# Patient Record
Sex: MALE | URBAN - METROPOLITAN AREA
[De-identification: names, ages, dates, MRNs, and addresses within clinical notes are randomized per-mention and may not be internally consistent; named-entity substitution may affect disease eponyms.]

---

## 2018-03-01 ENCOUNTER — OFFICE VISIT (OUTPATIENT)
Dept: FAMILY MEDICINE CLINIC | Facility: CLINIC | Age: 63
End: 2018-03-01

## 2018-03-01 VITALS
BODY MASS INDEX: 26.04 KG/M2 | HEIGHT: 69 IN | HEART RATE: 72 BPM | OXYGEN SATURATION: 96 % | RESPIRATION RATE: 16 BRPM | WEIGHT: 175.81 LBS | TEMPERATURE: 98 F | DIASTOLIC BLOOD PRESSURE: 84 MMHG | SYSTOLIC BLOOD PRESSURE: 132 MMHG

## 2018-03-01 DIAGNOSIS — J20.9 ACUTE BRONCHITIS, UNSPECIFIED ORGANISM: Primary | ICD-10-CM

## 2018-03-01 PROCEDURE — 99204 OFFICE O/P NEW MOD 45 MIN: CPT | Performed by: FAMILY MEDICINE

## 2018-03-01 RX ORDER — QUETIAPINE 50 MG/1
50 TABLET, FILM COATED ORAL DAILY
COMMUNITY

## 2018-03-01 RX ORDER — ALPRAZOLAM 0.25 MG/1
0.25 TABLET ORAL 2 TIMES DAILY PRN
COMMUNITY

## 2018-03-01 RX ORDER — CITALOPRAM 40 MG/1
20 TABLET ORAL 2 TIMES DAILY
COMMUNITY
End: 2018-03-29

## 2018-03-01 RX ORDER — ECHINACEA 400 MG
1000 CAPSULE ORAL DAILY
COMMUNITY

## 2018-03-01 RX ORDER — DILTIAZEM HYDROCHLORIDE 120 MG/1
120 CAPSULE, COATED, EXTENDED RELEASE ORAL DAILY
COMMUNITY
End: 2018-04-09

## 2018-03-01 RX ORDER — AMITRIPTYLINE HYDROCHLORIDE 100 MG/1
50 TABLET, FILM COATED ORAL NIGHTLY
COMMUNITY
End: 2018-03-29 | Stop reason: DRUGHIGH

## 2018-03-01 RX ORDER — ATORVASTATIN CALCIUM 10 MG/1
10 TABLET, FILM COATED ORAL NIGHTLY
COMMUNITY
End: 2018-04-09

## 2018-03-01 RX ORDER — SULFAMETHOXAZOLE AND TRIMETHOPRIM 800; 160 MG/1; MG/1
1 TABLET ORAL 2 TIMES DAILY
Qty: 20 TABLET | Refills: 0 | Status: SHIPPED | OUTPATIENT
Start: 2018-03-01 | End: 2018-03-11

## 2018-03-01 RX ORDER — NAPROXEN 500 MG/1
500 TABLET ORAL 2 TIMES DAILY PRN
COMMUNITY

## 2018-03-01 RX ORDER — FLUTICASONE PROPIONATE 50 MCG
1 SPRAY, SUSPENSION (ML) NASAL DAILY
COMMUNITY

## 2018-03-01 RX ORDER — BUSPIRONE HYDROCHLORIDE 15 MG/1
15 TABLET ORAL 2 TIMES DAILY
COMMUNITY

## 2018-03-01 RX ORDER — BUTALBITAL/ASPIRIN/CAFFEINE 50-325-40
1 CAPSULE ORAL AS NEEDED
COMMUNITY
End: 2018-03-29

## 2018-03-01 RX ORDER — CETIRIZINE HYDROCHLORIDE 10 MG/1
10 TABLET ORAL DAILY
COMMUNITY
Start: 2016-12-01

## 2018-03-01 NOTE — TELEPHONE ENCOUNTER
Pt seen today,  Pt states he called Olga Faulkner re: referral -contact provided to pt earlier today. Pt has 3 names of providers- for neuropsych  1. Dr. Lizzeth Faulkner  #[phone]-  Pt states she left message for this provider.   2. Dr. Steve Gonzalez  #[phone]

## 2018-03-01 NOTE — PROGRESS NOTES
Chief Complaint:   Patient presents with:  Cough: x1 wk  Chest Congestion      HPI:   This is a 65year old male coming in for acute illness patient with cough that has been going on for 1-2 weeks.     Patient with a history reported by the patient of COPD DilTIAZem HCl ER Coated Beads 120 MG Oral Capsule SR 24 Hr Take 120 mg by mouth daily. Disp:  Rfl:    Flaxseed, Linseed, (FLAXSEED OIL) 1000 MG Oral Cap Take 1,000 mg by mouth daily.  Disp:  Rfl:    Flunisolide HFA 80 MCG/ACT Inhalation Aero Soln Inhale intolerance,   ALLERGIES:  Denies allergic response,    EXAM:   /84 (BP Location: Left arm, Patient Position: Sitting, Cuff Size: large)   Pulse 72   Temp 98 °F (36.7 °C)   Resp 16   Ht 69\"   Wt 175 lb 12.8 oz   SpO2 96%   BMI 25.96 kg/m²  Estimated Plan for appointment with [facility] for counseling     Plan for referral to neurology regarding migraines.   Plan for referral to psychiatry                   Patient/Caregiver Education: Patient/Caregiver Education: There are no barriers to learn

## 2018-03-01 NOTE — PATIENT INSTRUCTIONS
Start antibiotic     Continue with inhalers. Obtain medical record release from last primary care doctor       Plan for appointment with Mook Bedolla for counseling     Plan for referral to neurology regarding migraines.   Plan for referral to psychi

## 2018-03-02 PROBLEM — Z87.891 HISTORY OF TOBACCO USE: Status: ACTIVE | Noted: 2017-11-14

## 2018-03-02 PROBLEM — F41.9 ANXIETY: Status: ACTIVE | Noted: 2017-11-14

## 2018-03-02 PROBLEM — J30.1 CHRONIC SEASONAL ALLERGIC RHINITIS DUE TO POLLEN: Status: ACTIVE | Noted: 2017-11-14

## 2018-03-02 PROBLEM — R91.1 PULMONARY NODULE: Status: ACTIVE | Noted: 2017-11-14

## 2018-03-02 PROBLEM — J41.0 SIMPLE CHRONIC BRONCHITIS (HCC): Status: ACTIVE | Noted: 2017-11-14

## 2018-03-05 PROBLEM — F31.60 BIPOLAR AFFECTIVE DISORDER, CURRENT EPISODE MIXED (HCC): Status: ACTIVE | Noted: 2018-03-05

## 2018-03-05 NOTE — TELEPHONE ENCOUNTER
I am not finding the providers in the system that patient was given. Discussed with Melanie Bess -     Referral for [facility].  Department - ok to direct to provider in [facility]

## 2018-03-05 NOTE — TELEPHONE ENCOUNTER
Pt informed per our central referral department: \"Per Keila galvan [facility]([phone]) referral is not required for Psychiatry as long as patient receives service through [facility]. Referral will be closed.  \"    Informed pt that a referral should not be need

## 2018-03-05 NOTE — TELEPHONE ENCOUNTER
Message sent to central referral pool. The referral is 'open' status right now - will check again tomorrow if it has been authorized and then call patient.

## 2018-03-06 ENCOUNTER — TELEPHONE (OUTPATIENT)
Dept: FAMILY MEDICINE CLINIC | Facility: CLINIC | Age: 63
End: 2018-03-06

## 2018-03-06 DIAGNOSIS — F31.64 BIPOLAR DISORDER, CURRENT EPISODE MIXED, SEVERE, WITH PSYCHOTIC FEATURES (HCC): ICD-10-CM

## 2018-03-06 DIAGNOSIS — G43.919 INTRACTABLE MIGRAINE WITHOUT STATUS MIGRAINOSUS, UNSPECIFIED MIGRAINE TYPE: Primary | ICD-10-CM

## 2018-03-06 NOTE — TELEPHONE ENCOUNTER
Pt states that [facility] does not have a neuropsych option that patient can make appointment with. Spoke with Dr. Singh fruux who states that he can do a neurology referral with Dr. Deonna Jefferson and then also see a psychiatrist at [facility].      Referral pended fo

## 2018-03-07 PROBLEM — G43.919 INTRACTABLE MIGRAINE: Status: ACTIVE | Noted: 2018-03-07

## 2018-03-07 NOTE — TELEPHONE ENCOUNTER
Patient informed that referral was authorized and was given Dr. Emmanuel Bermeo information. He states he has not called [facility] to make appt there yet but will call today and call us back if there are any problems.

## 2018-03-07 NOTE — TELEPHONE ENCOUNTER
Please sign referral so it can be authorized through insurance. I will then give pt a call later today.

## 2018-03-20 PROBLEM — J44.9 COPD (CHRONIC OBSTRUCTIVE PULMONARY DISEASE) (HCC): Status: ACTIVE | Noted: 2018-03-20

## 2018-03-21 ENCOUNTER — TELEPHONE (OUTPATIENT)
Dept: FAMILY MEDICINE CLINIC | Facility: CLINIC | Age: 63
End: 2018-03-21

## 2018-03-21 NOTE — TELEPHONE ENCOUNTER
Spoke w/ patient and he states that he has written orders for lab work that psychiatrist in [address] [place] [address] wants him to have done. He states that he is confused w/ the insurance and wants to have labs drawn here.      Lab orders are already in system, I helped

## 2018-03-21 NOTE — TELEPHONE ENCOUNTER
We received a signed authorization from patient to have us request records from [address] [place] [address] Neurology. The request was mailed to [address] [address], [place], [address].

## 2018-03-21 NOTE — TELEPHONE ENCOUNTER
referrral psych   is ordering a battery of BW tests including VitD;Unine for carcinagens/drug testing etc    can Dr Hernandez Manifold order so his insurance will cover the charges.

## 2018-03-22 PROBLEM — F32.A DEPRESSIVE DISORDER: Status: ACTIVE | Noted: 2018-03-05

## 2018-03-22 PROBLEM — F15.20 CAFFEINE DEPENDENCE (HCC): Status: ACTIVE | Noted: 2018-03-20

## 2018-03-22 PROBLEM — Z72.89 ALCOHOL USE: Status: ACTIVE | Noted: 2018-03-20

## 2018-03-22 PROBLEM — F41.9 ANXIETY DISORDER: Status: ACTIVE | Noted: 2018-03-22

## 2018-03-22 PROBLEM — F84.0 AUTISM SPECTRUM DISORDER: Status: ACTIVE | Noted: 2018-03-22

## 2018-03-22 PROBLEM — F41.9 ANXIETY DISORDER: Status: ACTIVE | Noted: 2018-03-20

## 2018-03-22 PROBLEM — Z86.69 HX OF MIGRAINES: Status: ACTIVE | Noted: 2018-03-07

## 2018-03-22 PROBLEM — G58.9 PINCHED NERVE: Status: ACTIVE | Noted: 2018-03-20

## 2018-03-22 PROBLEM — M19.90 ARTHRITIS: Status: ACTIVE | Noted: 2018-03-20

## 2018-03-22 PROBLEM — H53.50 COLORBLINDNESS: Status: ACTIVE | Noted: 2018-03-22

## 2018-03-24 ENCOUNTER — APPOINTMENT (OUTPATIENT)
Dept: LAB | Age: 63
End: 2018-03-24
Attending: PHYSICIAN ASSISTANT
Payer: COMMERCIAL

## 2018-03-24 DIAGNOSIS — Z13.89 SCREENING FOR SUBSTANCE ABUSE: ICD-10-CM

## 2018-03-24 DIAGNOSIS — F32.A DEPRESSIVE DISORDER: ICD-10-CM

## 2018-03-24 DIAGNOSIS — F41.9 ANXIETY DISORDER, UNSPECIFIED TYPE: ICD-10-CM

## 2018-03-24 LAB
AMPHETAMINE URINE: NEGATIVE
CANNABINOID URINE: NEGATIVE
COCAINE URINE: NEGATIVE
ETHYL ALCOHOL, QUALITATIVE: NEGATIVE
OPIATE URINE: NEGATIVE
PCP URINE: NEGATIVE

## 2018-03-24 PROCEDURE — 84439 ASSAY OF FREE THYROXINE: CPT | Performed by: PHYSICIAN ASSISTANT

## 2018-03-24 PROCEDURE — 80053 COMPREHEN METABOLIC PANEL: CPT | Performed by: PHYSICIAN ASSISTANT

## 2018-03-24 PROCEDURE — 36415 COLL VENOUS BLD VENIPUNCTURE: CPT | Performed by: PHYSICIAN ASSISTANT

## 2018-03-24 PROCEDURE — 82306 VITAMIN D 25 HYDROXY: CPT | Performed by: PHYSICIAN ASSISTANT

## 2018-03-24 PROCEDURE — 80050 GENERAL HEALTH PANEL: CPT

## 2018-03-24 PROCEDURE — 84443 ASSAY THYROID STIM HORMONE: CPT | Performed by: PHYSICIAN ASSISTANT

## 2018-03-24 PROCEDURE — 80061 LIPID PANEL: CPT | Performed by: PHYSICIAN ASSISTANT

## 2018-03-24 PROCEDURE — 83036 HEMOGLOBIN GLYCOSYLATED A1C: CPT | Performed by: PHYSICIAN ASSISTANT

## 2018-03-24 PROCEDURE — 80307 DRUG TEST PRSMV CHEM ANLYZR: CPT

## 2018-03-26 PROBLEM — R79.89 LOW VITAMIN D LEVEL: Status: ACTIVE | Noted: 2018-03-26

## 2018-03-26 PROBLEM — R73.03 PREDIABETES: Status: ACTIVE | Noted: 2018-03-26

## 2018-03-26 PROBLEM — R94.6 ABNORMAL THYROID FUNCTION TEST: Status: ACTIVE | Noted: 2018-03-26

## 2018-03-27 ENCOUNTER — TELEPHONE (OUTPATIENT)
Dept: FAMILY MEDICINE CLINIC | Facility: CLINIC | Age: 63
End: 2018-03-27

## 2018-03-27 NOTE — TELEPHONE ENCOUNTER
----- Message from Pooja Dubose sent at 3/26/2018  6:33 PM CDT -----  RN,     Please contact the patient to inform him that we received a copy of his most recent lab results. His vitamin D level was low.   His hemoglobin A1c was elevated and in the

## 2018-03-27 NOTE — TELEPHONE ENCOUNTER
Patient was new the office with last visit. Has some abnormalities on blood work     If patient is see me as PCP , he needs to set up appointment regarding labs.

## 2018-03-27 NOTE — TELEPHONE ENCOUNTER
Patient states he would like his PCP changed to Dr. Jose Alejandro. Messaged Niki Weber about changing the PCP in [place]. Patient states he has a f/u appt w/ Dr. Jose Alejandro and would like to go over labs then.      Future Appointments  Date Time Provider Harsh Robert   3/2

## 2018-03-29 ENCOUNTER — OFFICE VISIT (OUTPATIENT)
Dept: NEUROLOGY | Facility: CLINIC | Age: 63
End: 2018-03-29

## 2018-03-29 VITALS
HEART RATE: 74 BPM | DIASTOLIC BLOOD PRESSURE: 88 MMHG | WEIGHT: 180 LBS | RESPIRATION RATE: 16 BRPM | SYSTOLIC BLOOD PRESSURE: 138 MMHG | BODY MASS INDEX: 27 KG/M2

## 2018-03-29 DIAGNOSIS — R20.2 PARESTHESIAS: ICD-10-CM

## 2018-03-29 DIAGNOSIS — R41.3 MEMORY LOSS: ICD-10-CM

## 2018-03-29 DIAGNOSIS — Z86.69 HX OF MIGRAINES: Primary | ICD-10-CM

## 2018-03-29 DIAGNOSIS — G58.9 PINCHED NERVE: ICD-10-CM

## 2018-03-29 PROCEDURE — 99205 OFFICE O/P NEW HI 60 MIN: CPT | Performed by: OTHER

## 2018-03-29 RX ORDER — SUMATRIPTAN 50 MG/1
50 TABLET, FILM COATED ORAL EVERY 2 HOUR PRN
Qty: 9 TABLET | Refills: 0 | Status: SHIPPED | OUTPATIENT
Start: 2018-03-29 | End: 2018-04-28

## 2018-03-29 RX ORDER — CITALOPRAM 20 MG/1
20 TABLET ORAL 2 TIMES DAILY
COMMUNITY

## 2018-03-29 RX ORDER — AMITRIPTYLINE HYDROCHLORIDE 25 MG/1
50 TABLET, FILM COATED ORAL
COMMUNITY
Start: 2018-02-02 | End: 2018-03-29

## 2018-03-29 RX ORDER — AMITRIPTYLINE HYDROCHLORIDE 25 MG/1
50 TABLET, FILM COATED ORAL NIGHTLY
Qty: 60 TABLET | Refills: 4 | Status: SHIPPED | OUTPATIENT
Start: 2018-03-29

## 2018-03-29 NOTE — PROGRESS NOTES
Neurology H&P    Tayla Toddmp Patient Status:  No patient class for patient encounter    1955 MRN [id number]   Location ED [facility], [address], [address] Attending No att. providers found   [address] [facility] Day # 0 PCP Deborah Burgos MD also tells me that he has a \"pinched nerve in my neck\" He states that he was supposed to have an EMG in the [place]. He tells me that he had an MRI and that he was told \"I have neck and arm problems because things are starting to close off\".       Current M Disp:  Rfl:    IBUPROFEN 200 MG OR TABS 2 TABLET PRN Disp:  Rfl:        Problem List:  Patient Active Problem List:     Simple chronic bronchitis (HCC)     Pulmonary nodule     Periodic limb movement disorder     History of tobacco use     Chronic seasonal urinating   Heme: no new bruising, no unexplained fevers or chills  Endocrine: no unexplained weight loss or gain, no new fatigue  Musculoskeletal: denies back pain, denies joint pain  Psych: denies depression   Skin: denies any new masses, rashes, lumps o  No areas of restricted diffusion are evident to suggest acute cortical infarction.  No abnormal intracranial enhancement is seen following gadolinium administration.  The anterior midline structures and craniovertebral junction are unremarkable.  The basa copy cube  - Referral to neuroppsychology    Tess Covert, DO  Neurology

## 2018-03-29 NOTE — PROGRESS NOTES
Pt here for evaluation of migraines. Pt reports he likely started experiencing migraines in high school. He was officially diagnosed at ED after a 2 week headache last fall. Pt started amitriptyline about 3-4 months ago and has found relief with that.

## 2018-03-29 NOTE — PATIENT INSTRUCTIONS
Refill policies:    • Allow 2-3 business days for refills; controlled substances may take longer.   • Contact your pharmacy at least 5 days prior to running out of medication and have them send an electronic request or submit request through the [facility] for the entire amount billed. Precertification and Prior Authorizations  If your physician has recommended that you have a procedure or additional testing performed.   [employer] ([employer]) will contact your insurance carrier to obtain pr

## 2018-04-06 ENCOUNTER — TELEPHONE (OUTPATIENT)
Dept: FAMILY MEDICINE CLINIC | Facility: CLINIC | Age: 63
End: 2018-04-06

## 2018-04-06 NOTE — TELEPHONE ENCOUNTER
We received a request for records from [address] [place] [address]. They are requesting copies of all records from patient's chart. This request was sent to scan stat.

## 2018-04-09 ENCOUNTER — OFFICE VISIT (OUTPATIENT)
Dept: FAMILY MEDICINE CLINIC | Facility: CLINIC | Age: 63
End: 2018-04-09

## 2018-04-09 VITALS
DIASTOLIC BLOOD PRESSURE: 84 MMHG | BODY MASS INDEX: 25.97 KG/M2 | HEART RATE: 74 BPM | SYSTOLIC BLOOD PRESSURE: 128 MMHG | WEIGHT: 179.38 LBS | HEIGHT: 69.5 IN | RESPIRATION RATE: 20 BRPM | OXYGEN SATURATION: 97 % | TEMPERATURE: 99 F

## 2018-04-09 DIAGNOSIS — R79.89 LOW VITAMIN D LEVEL: ICD-10-CM

## 2018-04-09 DIAGNOSIS — E78.00 HYPERCHOLESTEREMIA: ICD-10-CM

## 2018-04-09 DIAGNOSIS — J44.1 CHRONIC OBSTRUCTIVE PULMONARY DISEASE WITH ACUTE EXACERBATION (HCC): ICD-10-CM

## 2018-04-09 DIAGNOSIS — J20.9 ACUTE BRONCHITIS, UNSPECIFIED ORGANISM: Primary | ICD-10-CM

## 2018-04-09 PROCEDURE — 99215 OFFICE O/P EST HI 40 MIN: CPT | Performed by: FAMILY MEDICINE

## 2018-04-09 RX ORDER — SULFAMETHOXAZOLE AND TRIMETHOPRIM 800; 160 MG/1; MG/1
1 TABLET ORAL 2 TIMES DAILY
Qty: 20 TABLET | Refills: 0 | Status: SHIPPED | OUTPATIENT
Start: 2018-04-09 | End: 2018-04-19

## 2018-04-09 RX ORDER — ATORVASTATIN CALCIUM 10 MG/1
10 TABLET, FILM COATED ORAL NIGHTLY
Qty: 90 TABLET | Refills: 3 | Status: SHIPPED | OUTPATIENT
Start: 2018-04-09

## 2018-04-09 RX ORDER — DILTIAZEM HYDROCHLORIDE 120 MG/1
120 CAPSULE, COATED, EXTENDED RELEASE ORAL DAILY
Qty: 90 CAPSULE | Refills: 3 | Status: SHIPPED | OUTPATIENT
Start: 2018-04-09

## 2018-04-09 NOTE — PATIENT INSTRUCTIONS
Start antibiotic     Start vit D supplement - 1000 IU one tab twice daily     ( recheck Vit D level in 3 -4 months)     Continue with cholesterol medication and blood pressure medication - will check with coverage for these     Recheck with me in 6 months.

## 2018-04-09 NOTE — PROGRESS NOTES
Chief Complaint:   Patient presents with:  Bronchitis: sxs started again last 2 weeks      HPI:   This is a 65year old male coming in for follow-up care regarding his COPD. Patient has been seen in COPD clinic in [place].     I saw him last month he feels the lungs every 4 (four) hours as needed. Disp:  Rfl:    ALPRAZolam 0.25 MG Oral Tab Take 0.25 mg by mouth 2 (two) times daily as needed. Disp:  Rfl:    BusPIRone HCl 15 MG Oral Tab Take 15 mg by mouth 2 (two) times daily.  Disp:  Rfl:    cetirizine 10 MG O 69.5\"   Wt 179 lb 6.4 oz   SpO2 97%   BMI 26.11 kg/m²  Estimated body mass index is 26.11 kg/m² as calculated from the following:    Height as of this encounter: 69.5\". Weight as of this encounter: 179 lb 6.4 oz. Vital signs reviewed. Appears stated 1000 IU one tab twice daily     ( recheck Vit D level in 3 -4 months)     Continue with cholesterol medication and blood pressure medication - will check with coverage for these     Recheck with me in 6 months.                Patient/Caregiver Education: Pa

## 2018-09-06 ENCOUNTER — TELEPHONE (OUTPATIENT)
Dept: FAMILY MEDICINE CLINIC | Facility: CLINIC | Age: 63
End: 2018-09-06

## 2018-09-06 NOTE — TELEPHONE ENCOUNTER
We received a medical records disc from [address] [place] [address]. Due to the fact we are not able to view the records from the disc, it has been mailed to the patient.

## 2018-12-01 ENCOUNTER — PRIOR ORIGINAL RECORDS (OUTPATIENT)
Dept: HEALTH INFORMATION MANAGEMENT | Facility: OTHER | Age: 63
End: 2018-12-01

## 2019-01-01 ENCOUNTER — EXTERNAL RECORD (OUTPATIENT)
Dept: HEALTH INFORMATION MANAGEMENT | Facility: OTHER | Age: 64
End: 2019-01-01

## 2019-02-25 ENCOUNTER — TELEPHONE (OUTPATIENT)
Dept: NEUROLOGY | Facility: CLINIC | Age: 64
End: 2019-02-25

## 2019-05-01 NOTE — TELEPHONE ENCOUNTER
Patient is due for appointment with Dr. Cy Chapman. Left message for patient to call the office back.      Future appt:    Last Appointment: 4/9/18 sick visit  Cholesterol, Total (mg/dL)   Date Value   03/24/2018 161     HDL Cholesterol (mg/dL)   Date Value   03/2

## 2019-05-07 RX ORDER — ATORVASTATIN CALCIUM 10 MG/1
TABLET, FILM COATED ORAL
Qty: 30 TABLET | Refills: 11 | OUTPATIENT
Start: 2019-05-07

## 2019-05-16 ENCOUNTER — HOSPITAL (OUTPATIENT)
Dept: OTHER | Age: 64
End: 2019-05-16
Attending: UROLOGY

## 2019-05-16 ENCOUNTER — OFFICE VISIT (OUTPATIENT)
Dept: UROLOGY | Age: 64
End: 2019-05-16

## 2019-05-16 DIAGNOSIS — R30.0 DYSURIA: Primary | ICD-10-CM

## 2019-05-16 DIAGNOSIS — R39.15 URGENCY OF URINATION: ICD-10-CM

## 2019-05-16 DIAGNOSIS — C61 MALIGNANT NEOPLASM OF PROSTATE (CMD): ICD-10-CM

## 2019-05-16 DIAGNOSIS — N20.0 NEPHROLITHIASIS: ICD-10-CM

## 2019-05-16 LAB
APPEARANCE, POC: NORMAL
BILIRUBIN, POC: NEGATIVE
BLDR SCAN MLS: 1
COLOR, POC: NORMAL
GLUCOSE UR-MCNC: NEGATIVE MG/DL
KETONES, POC: NEGATIVE
NITRITE, POC: NEGATIVE
OCCULT BLOOD, POC: NORMAL
PH UR: 6 [PH] (ref 5–7)
PROT UR-MCNC: NEGATIVE G/DL
SP GR UR: 1.02 (ref 1–1.03)
UROBILINOGEN UR-MCNC: 0.2 MG/DL (ref 0–1)
WBC (LEUKOCYTE) ESTERASE, POC: NEGATIVE

## 2019-05-16 PROCEDURE — 99203 OFFICE O/P NEW LOW 30 MIN: CPT | Performed by: UROLOGY

## 2019-05-16 PROCEDURE — 51798 US URINE CAPACITY MEASURE: CPT | Performed by: UROLOGY

## 2019-05-16 PROCEDURE — 81003 URINALYSIS AUTO W/O SCOPE: CPT | Performed by: UROLOGY

## 2019-05-16 RX ORDER — FLUTICASONE PROPIONATE 50 MCG
SPRAY, SUSPENSION (ML) NASAL DAILY
COMMUNITY

## 2019-05-16 RX ORDER — MAGNESIUM GLUCONATE 30 MG(550)
TABLET ORAL
COMMUNITY

## 2019-05-16 RX ORDER — QUETIAPINE FUMARATE 50 MG/1
50 TABLET, FILM COATED ORAL 2 TIMES DAILY
COMMUNITY

## 2019-05-16 RX ORDER — FOLIC ACID 0.8 MG
TABLET ORAL
COMMUNITY

## 2019-05-16 RX ORDER — GUAIFENESIN 400 MG/1
400 TABLET ORAL
COMMUNITY

## 2019-05-16 RX ORDER — PHENYLEPHRINE HCL 10 MG/1
10 TABLET, FILM COATED ORAL
COMMUNITY

## 2019-05-16 RX ORDER — CITALOPRAM 40 MG/1
40 TABLET ORAL DAILY
COMMUNITY

## 2019-05-16 RX ORDER — MULTIVIT-MIN/IRON/FOLIC ACID/K 18-600-40
CAPSULE ORAL
COMMUNITY

## 2019-05-16 RX ORDER — BUSPIRONE HYDROCHLORIDE 30 MG/1
30 TABLET ORAL 2 TIMES DAILY
COMMUNITY

## 2019-05-16 SDOH — ECONOMIC STABILITY: TRANSPORTATION INSECURITY
IN THE PAST 12 MONTHS, HAS THE LACK OF TRANSPORTATION KEPT YOU FROM MEDICAL APPOINTMENTS OR FROM GETTING MEDICATIONS?: PATIENT DECLINED

## 2019-05-16 SDOH — SOCIAL STABILITY: SOCIAL NETWORK: ARE YOU MARRIED, WIDOWED, DIVORCED, SEPARATED, NEVER MARRIED, OR LIVING WITH A PARTNER?: MARRIED

## 2019-05-16 SDOH — ECONOMIC STABILITY: TRANSPORTATION INSECURITY
IN THE PAST 12 MONTHS, HAS LACK OF TRANSPORTATION KEPT YOU FROM MEETINGS, WORK, OR FROM GETTING THINGS NEEDED FOR DAILY LIVING?: PATIENT DECLINED

## 2019-05-16 SDOH — ECONOMIC STABILITY: FOOD INSECURITY: WITHIN THE PAST 12 MONTHS, THE FOOD YOU BOUGHT JUST DIDN'T LAST AND YOU DIDN'T HAVE MONEY TO GET MORE.: PATIENT DECLINED

## 2019-05-16 SDOH — SOCIAL STABILITY: SOCIAL INSECURITY
WITHIN THE LAST YEAR, HAVE YOU BEEN KICKED, HIT, SLAPPED, OR OTHERWISE PHYSICALLY HURT BY YOUR PARTNER OR EX-PARTNER?: PATIENT DECLINED

## 2019-05-16 SDOH — SOCIAL STABILITY: SOCIAL NETWORK: IN A TYPICAL WEEK, HOW MANY TIMES DO YOU TALK ON THE PHONE WITH FAMILY, FRIENDS, OR NEIGHBORS?: PATIENT DECLINED

## 2019-05-16 SDOH — ECONOMIC STABILITY: FOOD INSECURITY: WITHIN THE PAST 12 MONTHS, YOU WORRIED THAT YOUR FOOD WOULD RUN OUT BEFORE YOU GOT MONEY TO BUY MORE.: PATIENT DECLINED

## 2019-05-16 SDOH — SOCIAL STABILITY: SOCIAL INSECURITY
WITHIN THE LAST YEAR, HAVE TO BEEN RAPED OR FORCED TO HAVE ANY KIND OF SEXUAL ACTIVITY BY YOUR PARTNER OR EX-PARTNER?: PATIENT DECLINED

## 2019-05-16 SDOH — ECONOMIC STABILITY: INCOME INSECURITY: HOW HARD IS IT FOR YOU TO PAY FOR THE VERY BASICS LIKE FOOD, HOUSING, MEDICAL CARE, AND HEATING?: PATIENT DECLINED

## 2019-05-16 SDOH — SOCIAL STABILITY: SOCIAL INSECURITY: WITHIN THE LAST YEAR, HAVE YOU BEEN AFRAID OF YOUR PARTNER OR EX-PARTNER?: PATIENT DECLINED

## 2019-05-16 SDOH — HEALTH STABILITY: MENTAL HEALTH
STRESS IS WHEN SOMEONE FEELS TENSE, NERVOUS, ANXIOUS, OR CAN'T SLEEP AT NIGHT BECAUSE THEIR MIND IS TROUBLED. HOW STRESSED ARE YOU?: PATIENT DECLINED

## 2019-05-16 SDOH — SOCIAL STABILITY: SOCIAL NETWORK: HOW OFTEN DO YOU ATTEND CHURCH OR RELIGIOUS SERVICES?: PATIENT DECLINED

## 2019-05-16 SDOH — SOCIAL STABILITY: SOCIAL NETWORK: HOW OFTEN DO YOU GET TOGETHER WITH FRIENDS OR RELATIVES?: PATIENT DECLINED

## 2019-05-16 SDOH — SOCIAL STABILITY: SOCIAL NETWORK: HOW OFTEN DO YOU ATTENT MEETINGS OF THE CLUB OR ORGANIZATION YOU BELONG TO?: PATIENT DECLINED

## 2019-05-16 SDOH — HEALTH STABILITY: PHYSICAL HEALTH
ON AVERAGE, HOW MANY DAYS PER WEEK DO YOU ENGAGE IN MODERATE TO STRENUOUS EXERCISE (LIKE A BRISK WALK)?: PATIENT DECLINED

## 2019-05-16 SDOH — SOCIAL STABILITY: SOCIAL INSECURITY
WITHIN THE LAST YEAR, HAVE YOU BEEN HUMILIATED OR EMOTIONALLY ABUSED IN OTHER WAYS BY YOUR PARTNER OR EX-PARTNER?: PATIENT DECLINED

## 2019-05-16 SDOH — SOCIAL STABILITY: SOCIAL NETWORK
DO YOU BELONG TO ANY CLUBS OR ORGANIZATIONS SUCH AS CHURCH GROUPS UNIONS, FRATERNAL OR ATHLETIC GROUPS, OR SCHOOL GROUPS?: PATIENT DECLINED

## 2019-05-16 SDOH — HEALTH STABILITY: PHYSICAL HEALTH: ON AVERAGE, HOW MANY MINUTES DO YOU ENGAGE IN EXERCISE AT THIS LEVEL?: PATIENT DECLINED

## 2019-05-16 ASSESSMENT — PAIN SCALES - GENERAL: PAINLEVEL: 0

## 2019-05-22 DIAGNOSIS — C61 MALIGNANT NEOPLASM OF PROSTATE (CMD): ICD-10-CM

## 2019-05-24 DIAGNOSIS — N20.0 NEPHROLITHIASIS: Primary | ICD-10-CM

## 2019-05-24 RX ORDER — SULFAMETHOXAZOLE AND TRIMETHOPRIM 800; 160 MG/1; MG/1
1 TABLET ORAL 2 TIMES DAILY
Qty: 14 TABLET | Refills: 0 | Status: SHIPPED | OUTPATIENT
Start: 2019-05-24 | End: 2019-05-31

## 2019-05-29 ENCOUNTER — HOSPITAL (OUTPATIENT)
Dept: OTHER | Age: 64
End: 2019-05-29
Attending: UROLOGY

## 2019-05-29 LAB
ANION GAP SERPL CALC-SCNC: 12 MEQ/L (ref 10–20)
BUN SERPL-MCNC: 15 MG/DL (ref 6–20)
CALCIUM: 9 MG/DL (ref 8.4–10.2)
CHLORIDE: 108 MEQ/L (ref 97–107)
CREATININE: 1.07 MG/DL (ref 0.6–1.3)
GLUCOSE LVL: 106 MG/DL (ref 70–99)
HEMOLYSIS 2+: NEGATIVE
POTASSIUM: 4.4 MEQ/L (ref 3.5–5.1)
SODIUM: 138 MEQ/L (ref 136–145)
TCO2: 22 MEQ/L (ref 19–29)

## 2019-05-29 PROCEDURE — 50590 FRAGMENTING OF KIDNEY STONE: CPT | Performed by: UROLOGY

## 2019-05-30 ENCOUNTER — HOSPITAL (OUTPATIENT)
Dept: OTHER | Age: 64
End: 2019-05-30

## 2021-05-26 VITALS
SYSTOLIC BLOOD PRESSURE: 142 MMHG | HEIGHT: 71 IN | BODY MASS INDEX: 25.2 KG/M2 | HEART RATE: 70 BPM | TEMPERATURE: 98.1 F | WEIGHT: 180 LBS | DIASTOLIC BLOOD PRESSURE: 89 MMHG